# Patient Record
Sex: FEMALE | Race: WHITE | Employment: OTHER | URBAN - METROPOLITAN AREA
[De-identification: names, ages, dates, MRNs, and addresses within clinical notes are randomized per-mention and may not be internally consistent; named-entity substitution may affect disease eponyms.]

---

## 2017-01-10 ENCOUNTER — HOSPITAL ENCOUNTER (OUTPATIENT)
Dept: GENERAL RADIOLOGY | Age: 67
Discharge: HOME | End: 2017-01-10
Attending: PHYSICAL MEDICINE & REHABILITATION
Payer: MEDICARE

## 2017-01-10 DIAGNOSIS — M43.20 FUSION OF SPINE: ICD-10-CM

## 2017-01-10 PROCEDURE — 72050 X-RAY EXAM NECK SPINE 4/5VWS: CPT

## 2017-01-12 ENCOUNTER — HOSPITAL ENCOUNTER (OUTPATIENT)
Dept: MRI IMAGING | Age: 67
Discharge: HOME | End: 2017-01-12
Payer: MEDICARE

## 2017-01-12 DIAGNOSIS — I60.02: ICD-10-CM

## 2017-01-12 PROCEDURE — 70544 MR ANGIOGRAPHY HEAD W/O DYE: CPT

## 2017-05-31 RX ORDER — ESTRADIOL 10 UG/1
10 INSERT VAGINAL
COMMUNITY

## 2017-06-01 ENCOUNTER — HOSPITAL ENCOUNTER (OUTPATIENT)
Age: 67
Setting detail: OUTPATIENT SURGERY
Discharge: HOME | End: 2017-06-01
Attending: INTERNAL MEDICINE | Admitting: INTERNAL MEDICINE
Payer: MEDICARE

## 2017-06-01 ENCOUNTER — ANESTHESIA (OUTPATIENT)
Dept: ENDOSCOPY | Age: 67
End: 2017-06-01
Payer: MEDICARE

## 2017-06-01 ENCOUNTER — ANESTHESIA EVENT (OUTPATIENT)
Dept: ENDOSCOPY | Age: 67
End: 2017-06-01
Payer: MEDICARE

## 2017-06-01 VITALS
RESPIRATION RATE: 24 BRPM | HEART RATE: 64 BPM | SYSTOLIC BLOOD PRESSURE: 115 MMHG | BODY MASS INDEX: 18.9 KG/M2 | OXYGEN SATURATION: 100 % | WEIGHT: 113.44 LBS | DIASTOLIC BLOOD PRESSURE: 60 MMHG | HEIGHT: 65 IN

## 2017-06-01 PROCEDURE — 74011250636 HC RX REV CODE- 250/636

## 2017-06-01 PROCEDURE — 74011250636 HC RX REV CODE- 250/636: Performed by: INTERNAL MEDICINE

## 2017-06-01 PROCEDURE — 77030009426 HC FCPS BIOP ENDOSC BSC -B: Performed by: INTERNAL MEDICINE

## 2017-06-01 PROCEDURE — 88305 TISSUE EXAM BY PATHOLOGIST: CPT | Performed by: INTERNAL MEDICINE

## 2017-06-01 PROCEDURE — 74011000258 HC RX REV CODE- 258

## 2017-06-01 PROCEDURE — 76040000019: Performed by: INTERNAL MEDICINE

## 2017-06-01 PROCEDURE — 76060000031 HC ANESTHESIA FIRST 0.5 HR: Performed by: INTERNAL MEDICINE

## 2017-06-01 RX ORDER — FLUMAZENIL 0.1 MG/ML
0.2 INJECTION INTRAVENOUS
Status: DISCONTINUED | OUTPATIENT
Start: 2017-06-01 | End: 2017-06-01 | Stop reason: HOSPADM

## 2017-06-01 RX ORDER — DEXTROMETHORPHAN/PSEUDOEPHED 2.5-7.5/.8
1.2 DROPS ORAL
Status: DISCONTINUED | OUTPATIENT
Start: 2017-06-01 | End: 2017-06-01 | Stop reason: HOSPADM

## 2017-06-01 RX ORDER — SODIUM CHLORIDE 0.9 % (FLUSH) 0.9 %
5-10 SYRINGE (ML) INJECTION AS NEEDED
Status: DISCONTINUED | OUTPATIENT
Start: 2017-06-01 | End: 2017-06-01 | Stop reason: HOSPADM

## 2017-06-01 RX ORDER — SODIUM CHLORIDE 9 MG/ML
100 INJECTION, SOLUTION INTRAVENOUS CONTINUOUS
Status: DISCONTINUED | OUTPATIENT
Start: 2017-06-01 | End: 2017-06-01 | Stop reason: HOSPADM

## 2017-06-01 RX ORDER — NALOXONE HYDROCHLORIDE 0.4 MG/ML
0.4 INJECTION, SOLUTION INTRAMUSCULAR; INTRAVENOUS; SUBCUTANEOUS
Status: DISCONTINUED | OUTPATIENT
Start: 2017-06-01 | End: 2017-06-01 | Stop reason: HOSPADM

## 2017-06-01 RX ORDER — SODIUM CHLORIDE 0.9 % (FLUSH) 0.9 %
5-10 SYRINGE (ML) INJECTION EVERY 8 HOURS
Status: DISCONTINUED | OUTPATIENT
Start: 2017-06-01 | End: 2017-06-01 | Stop reason: HOSPADM

## 2017-06-01 RX ORDER — MIDAZOLAM HYDROCHLORIDE 1 MG/ML
.25-1 INJECTION, SOLUTION INTRAMUSCULAR; INTRAVENOUS
Status: DISCONTINUED | OUTPATIENT
Start: 2017-06-01 | End: 2017-06-01 | Stop reason: HOSPADM

## 2017-06-01 RX ORDER — ATROPINE SULFATE 0.1 MG/ML
0.5 INJECTION INTRAVENOUS
Status: DISCONTINUED | OUTPATIENT
Start: 2017-06-01 | End: 2017-06-01 | Stop reason: HOSPADM

## 2017-06-01 RX ORDER — FENTANYL CITRATE 50 UG/ML
200 INJECTION, SOLUTION INTRAMUSCULAR; INTRAVENOUS
Status: DISCONTINUED | OUTPATIENT
Start: 2017-06-01 | End: 2017-06-01 | Stop reason: HOSPADM

## 2017-06-01 RX ORDER — PROPOFOL 10 MG/ML
INJECTION, EMULSION INTRAVENOUS AS NEEDED
Status: DISCONTINUED | OUTPATIENT
Start: 2017-06-01 | End: 2017-06-01 | Stop reason: HOSPADM

## 2017-06-01 RX ORDER — EPINEPHRINE 0.1 MG/ML
1 INJECTION INTRACARDIAC; INTRAVENOUS
Status: DISCONTINUED | OUTPATIENT
Start: 2017-06-01 | End: 2017-06-01 | Stop reason: HOSPADM

## 2017-06-01 RX ORDER — SODIUM CHLORIDE 9 MG/ML
INJECTION, SOLUTION INTRAVENOUS
Status: DISCONTINUED | OUTPATIENT
Start: 2017-06-01 | End: 2017-06-01 | Stop reason: HOSPADM

## 2017-06-01 RX ADMIN — PROPOFOL 30 MG: 10 INJECTION, EMULSION INTRAVENOUS at 14:26

## 2017-06-01 RX ADMIN — SODIUM CHLORIDE: 9 INJECTION, SOLUTION INTRAVENOUS at 14:18

## 2017-06-01 RX ADMIN — SODIUM CHLORIDE 100 ML/HR: 900 INJECTION, SOLUTION INTRAVENOUS at 14:11

## 2017-06-01 RX ADMIN — PROPOFOL 50 MG: 10 INJECTION, EMULSION INTRAVENOUS at 14:23

## 2017-06-01 NOTE — PROCEDURES
[address] [place] [address], [address] [place] [address] (EGD) Procedure Note    Fausto Valera  1950  [id number]      Procedure: Endoscopic Gastroduodenoscopy with biopsy    Indication:  Abdominal pain, epigastric     Pre-operative Diagnosis: see indication above    Post-operative Diagnosis: see findings below    : Jostin Levin MD    Referring Provider:  Christina Weir MD      Anesthesia/Sedation:  MAC anesthesia Propofol        Procedure Details     After infomed consent was obtained for the procedure, with all risks and benefits of procedure explained the patient was taken to the endoscopy suite and placed in the left lateral decubitus position. Following sequential administration of sedation as per above, the endoscope was inserted into the mouth and advanced under direct vision to third portion of the duodenum. A careful inspection was made as the gastroscope was withdrawn, including a retroflexed view of the proximal stomach; findings and interventions are described below. Findings:   Esophagus:hiatal hernia 3 cm in size   Stomach: gastritis in antrum, biopsied  Duodenum: normal, biopsied      Therapies:  none    Specimens: as above         EBL: None      Complications:   None; patient tolerated the procedure well. Impression:    -See post-procedure diagnoses.     Recommendations:  -Continue acid suppression with dexilant 60 mg/d  -will order gastric scan to look for gastroparesis  -f/u in 6 weeks    Signed By: Jostin Levin MD     6/1/2017  2:32 PM

## 2017-06-01 NOTE — IP AVS SNAPSHOT
[address] 
[phone] Patient: Minta Angelucci MRN: [id number] You are allergic to the following Allergen Reactions Codeine Other (comments) Increased heart rate, \"felt like I was going to pass out\", nausea. Contrast Dye (Iodine) Other (comments) Urticaria. Demerol (Meperidine) Other (comments) Increased heart rate, \"felt like I was going to pass out, nausea. Dilaudid (Hydromorphone) Itching Epinephrine Nausea Only \"Felt as if I was going to pass out, and felt as if my heart rate was slowing down\". Nsaids (Non-Steroidal Anti-Inflammatory Drug) Other (comments) Have caused kidney problems. Recent Documentation Height Weight Breastfeeding? BMI OB Status Smoking Status 1.651 m 51.5 kg No 18.88 kg/m2 Postmenopausal Never Smoker Emergency Contacts Name Discharge Info Relation Home Work Mobile [employer] CAREGIVER [3] Spouse [3] [phone]  [phone] About your hospitalization You were admitted on:  2017 You last received care in the:  [facility] ENDOSCOPY You were discharged on:  2017 Unit phone number:  [phone] Why you were hospitalized Your primary diagnosis was:  Not on File Providers Seen During Your Hospitalizations Provider Role Specialty Primary office phone Jeet Concepcion MD Attending Provider Gastroenterology [phone] Your Primary Care Physician (PCP) Primary Care Physician Office Phone Office Fax [address] [phone] Follow-up Information None Current Discharge Medication List  
  
CONTINUE these medications which have NOT CHANGED Dose & Instructions Dispensing Information Comments Morning Noon Evening Bedtime  ASPIRIN PO  
   
 Your last dose was: Your next dose is:    
   
   
 Dose:  81 mg Take 81 mg by mouth daily. Refills:  0  
     
   
   
   
  
 atorvastatin 10 mg tablet Commonly known as:  LIPITOR Your last dose was: Your next dose is:    
   
   
 Dose:  10 mg Take 10 mg by mouth daily. Refills:  0  
     
   
   
   
  
 CALCIUM 600 WITH VITAMIN D3 PO Your last dose was: Your next dose is:    
   
   
 Dose:  600 mg Take 600 mg by mouth two (2) times a day. With 800 units of vitamin d in each pill plus minerals. Refills:  0 DEXILANT 30 mg capsule Generic drug:  dexlansoprazole Your last dose was: Your next dose is:    
   
   
 Dose:  60 mg Take 60 mg by mouth daily. Refills:  0  
     
   
   
   
  
 DIOVAN 80 mg tablet Generic drug:  valsartan Your last dose was: Your next dose is:    
   
   
 Dose:  80 mg Take 80 mg by mouth daily. Refills:  0  
     
   
   
   
  
 FLEXERIL 10 mg tablet Generic drug:  cyclobenzaprine Your last dose was: Your next dose is:    
   
   
 Dose:  10 mg Take 10 mg by mouth two (2) times daily as needed for Muscle Spasm(s). Refills:  0 LINZESS 290 mcg Cap capsule Generic drug:  linaclotide Your last dose was: Your next dose is:    
   
   
 Dose:  290 mcg Take 290 mcg by mouth every other day. Refills:  0 LUNESTA 3 mg tablet Generic drug:  eszopiclone Your last dose was: Your next dose is:    
   
   
 Dose:  3 mg Take 3 mg by mouth nightly. Refills:  0  
     
   
   
   
  
 ondansetron hcl 4 mg tablet Commonly known as:  Delmi Rodriguez Your last dose was: Your next dose is:    
   
   
 Dose:  4 mg Take 4 mg by mouth three (3) times daily as needed for Nausea. Indications: ACUTE GASTROENTERITIS-RELATED VOMITING IN PEDIATRICS Refills:  0 OXYCONTIN PO Your last dose was: Your next dose is:    
   
   
 Dose:  10 mg Take 10 mg by mouth daily as needed. Refills:  0 PERCOCET 5-325 mg per tablet Generic drug:  oxyCODONE-acetaminophen Your last dose was: Your next dose is:    
   
   
 Dose:  1 Tab Take 1 Tab by mouth two (2) times daily as needed for Pain. Refills:  0  
     
   
   
   
  
 sertraline 50 mg tablet Commonly known as:  ZOLOFT Your last dose was: Your next dose is:    
   
   
 Dose:  50 mg Take 50 mg by mouth daily. Refills:  0 YUVAFEM 10 mcg Tab vaginal tablet Generic drug:  estradiol Your last dose was: Your next dose is:    
   
   
 Dose:  10 mcg Insert 10 mcg into vagina every Monday and Thursday. Refills:  0 Discharge Instructions [address] [place] [address] DISCHARGE INSTRUCTIONS Tammy Washington [id number] 
1950 Discomfort: 
Sore throat- throat lozenges or warm salt water gargle 
redness at IV site- apply warm compress to area; if redness or soreness persist- contact your physician Gaseous discomfort- walking, belching will help relieve any discomfort You may not operate a vehicle for 12 hours You may not engage in an occupation involving machinery or appliances for rest of today You may not drink alcoholic beverages for at least 12 hours Avoid making any critical decisions for at least 24 hour DIET You may resume your regular diet  however -  remember your colon is empty and a heavy meal will produce gas. Avoid these foods:  vegetables, fried / greasy foods, carbonated drinks ACTIVITY You may resume your normal daily activities Spend the remainder of the day resting -  avoid any strenuous activity. CALL M.D. ANY SIGN OF Increasing pain, nausea, vomiting Abdominal distension (swelling) New increased bleeding (oral or rectal) Fever (chills) Pain in chest area Bloody discharge from nose or mouth Shortness of breath Follow-up Instructions: 
 Call Dr. Dona Higuera for any questions or problems and follow up with him in 6 weeks Telephone # [phone] Will order gastric scan to study your stomach motility ENDOSCOPY FINDINGS: 
 Your endoscopy showed gastritis, unchanged from before. Signed By: Dona Higuera MD   
 6/1/2017  2:36 PM 
  
 
 
Discharge Orders None Introducing [facility]! [facility] introduces [employer] patient portal. Now you can access parts of your medical record, email your doctor's office, and request medication refills online. 1. In your internet browser, go to [email or web address] 2. Click on the First Time User? Click Here link in the Sign In box. You will see the New Member Sign Up page. 3. Enter your [employer] Access Code exactly as it appears below. You will not need to use this code after youve completed the sign-up process. If you do not sign up before the expiration date, you must request a new code. · [employer] Access Code: [email or web address] Expires: 8/30/2017  1:03 PM 
 
4. Enter the last four digits of your Social Security Number (xxxx) and Date of Birth (mm/dd/yyyy) as indicated and click Submit. You will be taken to the next sign-up page. 5. Create a [employer] ID. This will be your [employer] login ID and cannot be changed, so think of one that is secure and easy to remember. 6. Create a [employer] password. You can change your password at any time. 7. Enter your Password Reset Question and Answer. This can be used at a later time if you forget your password. 8. Enter your e-mail address. You will receive e-mail notification when new information is available in [address] 9. Click Sign Up. You can now view and download portions of your medical record. 10. Click the Download Summary menu link to download a portable copy of your medical information. If you have questions, please visit the Frequently Asked Questions section of the [employer] website. Remember, MyChart is NOT to be used for urgent needs. For medical emergencies, dial 911. Now available from your iPhone and Android! General Information Please provide this summary of care documentation to your next provider. Patient Signature:  ____________________________________________________________ Date:  ____________________________________________________________  
  
Gabby Santana Provider Signature:  ____________________________________________________________ Date:  ____________________________________________________________

## 2017-06-01 NOTE — H&P
The patient is a 77year old female who presents with a complaint of Pain. The patient presents for follow up after procedure. The onset of the pain has been gradual and has been occurring in an intermittent pattern for 3 months. The symptoms have been associated with abdominal distention and constipation,  while the symptoms have not been associated with amenorrhea, anorexia, bloating, bloody stools, black stools, bulky stools, bone pain, chest pain, dark urine, diarrhea, dysuria, early satiety, family history of colon cancer, fever, heartburn, hematemesis, hematuria, jaundice, melena, nausea, passing worms, pica, use of alcohol, vaginal bleeding, vaginal discharge, vomiting or weight loss.  Note for \"Pain\": she has h/o GS, abdominal adhesions, c/o more lower abdominal pain with bloating, negative pelvic US, h/o GERD well controlled on dexilant, also followed by Dr. Mariia Frey for MGUS, family h/o colon cancer, due for colonoscopy, friend with my pt, Issa Burton, one BM q 4 days,a also c/o epigastric pain with nausea and radiation to back 11/4/15, feels much better s/p lap chol on oct 20 by Dr. Dolores Crenshaw, still on linzess for her constipation s/p negative EGD and colonoscopy 12/17/15, c/o nausea on daily basis since her lap chol with some mild epigastric pain, US and labs ordered by Dr. Miriam Little were normal, on dexilant, and zofran 5/31/17, c/o worsening nausea and epigastric pain for last 6 weeks, on dexilant 30 mg/d, no NSAIDS      Problem List/Past Medical Riya Alvarenga; 5/30/2017 10:31 AM)  Degenerative Disc Disease   Breast fibroadenoma (217  D24.9)   Nausea (787.02  R11.0)   Cerebral aneurysm (437.3  I67.1)   Endometriosis   Vertigo   Chronic anxiety (300.00  F41.9)   Abdominal swelling, generalized (789.37  R19.07)   Dysphagia (787.20  R13.10)   Constipation (564.00  K59.00)   Esophageal reflux (530.81  K21.9)   Epigastric pain (789.06  R10.13)   Abdominal pain (789.00  R10.9)   Abdominal pain, generalized (789.07  R10.84)   Chronic pain (338.29  G89.29)   Hypercholesterolemia   Dubon's Esophagus   Esophageal ulcer (530.20  K22.10)   Kidney damage (866.00  S37.009A)   Hypertension   Hiatal Hernia   Anemia   MGUS (monoclonal gammopathy of unknown significance) (273.1  D47.2)   High triglycerides (272.1  E78.1)   Gastroesophageal Reflux Disease   Anxiety/Depression   Osteopenia   Neck pain, chronic (723.1  M54.2)   Gallstone (574.20  K80.20)     Past Surgical History Betsy Chaparro; 5/30/2017 10:31 AM)  Non-Contributory Past Surgical History     Allergies Betsy Fordad; 5/30/2017 10:35 AM)  Dilaudid *ANALGESICS - OPIOID*   Erythromycins   EPINEPHrine *Vasopressors**   Codeine/Codeine Derivatives   Demerol *ANALGESICS - OPIOID*   Iodinated Contrast     Medication History Betsy Chaparro; 5/30/2017 10:36 AM)  Wayne Buddle (290MCG Capsule, 1 (one) Capsule Oral daily, take half hour before breakfast, Taken starting 12/13/2016) Active. Sertraline HCl  (50MG Tablet, Oral daily) Active. Calcium-Vitamin D  (600-800 Oral two daily) Specific dose unknown - Active. Dexilant  (30MG Capsule DR, Oral at bedtime) Active. Atorvastatin Calcium  (10MG Tablet, Oral daily) Active. Lunesta  (3MG Tablet, Oral at bedtime as needed) Active. Flexeril  (10MG Tablet, Oral daily) Active. Percocet  (5-325MG Tablet, Oral as needed) Active. Estradiol  (10MCG Tablet, Vaginal three times weekly) Active. Tylenol Extra Strength  (500MG Tablet, Oral as needed) Active. Diovan  (80MG Tablet, Oral daily) Active. Aspirin EC  (81MG Tablet DR, Oral daily) Active. Medications Reconciled     Family History Betsy Chaparro; 5/30/2017 10:36 AM)  Coronary Artery Disease  Mother, Father. Hypertension  Mother, Father. Heart Disease  Mother, Father. Colon Cancer  Father. FH: cerebral aneurysm (V17.1  Z82.49)  Sister. Social History Betsy Chaparro; 5/30/2017 10:36 AM)  Employment status  Retired.   Tobacco Use  Never smoker. Blood Transfusion  Yes. Alcohol Use  Occasional alcohol use, Drinks wine. Marital status  . Diagnostic Studies History Kandice Echeverria; 5/30/2017 10:36 AM)  Colonoscopy   Endoscopy     Health Maintenance History Kandice Echeverria; 5/30/2017 10:31 AM)  Pneumovax  None  Flu Vaccine 09/28/2015    Note: Histrocial Summary information received via phone call with patient on date:  5/30/2017     Review of Systems (Maurice Delaney; 5/31/2017 9:13 AM)  General Not Present- Chronic Fatigue, Poor Appetite, Weight Gain and Weight Loss. Skin Not Present- Itching, Rash and Skin Color Changes. HEENT Not Present- Hearing Loss and Vertigo. Respiratory Not Present- Difficulty Breathing and TB exposure. Cardiovascular Not Present- Chest Pain, Use of Antibiotics before Dental Procedures and Use of Blood Thinners. Gastrointestinal Present- Heartburn, History of Previous Colonoscopy, History of Previous Endoscopy, Indigestion, Nausea and See HPI. Musculoskeletal Not Present- Arthritis, Hip Replacement Surgery and Knee Replacement Surgery. Neurological Not Present- Weakness. Psychiatric Not Present- Depression. Endocrine Not Present- Diabetes and Thyroid Problems. Hematology Present- Bruising. Not Present- Anemia. Vitals (Maurice Delaney; 5/31/2017 9:16 AM)  5/31/2017 9:09 AM  Weight: 114 lb   Height: 65 in    Body Surface Area: 1.54 m²   Body Mass Index: 18.97 kg/m²  Pulse: 60 (Regular)    Resp. : 16 (Unlabored)     BP: 118/62 (Sitting, Left Arm, Standard)    5/30/2017 10:26 AM  Weight: 115 lb   Height: 65 in    Body Surface Area: 1.55 m²   Body Mass Index: 19.14 kg/m²        Physical Exam (Mary Campos MD; 5/31/2017 9:53 AM)  General  Mental Status - Alert. General Appearance - Cooperative, Pleasant, Not in acute distress. Orientation - Oriented X3. Build & Nutrition - Well nourished and Well developed.     Chest and Lung Exam  Percussion  Quality and Intensity - Percussion of the chest reveals - Normal resonance. Auscultation  Breath sounds - Normal. Adventitious sounds - No Adventitious sounds. Cardiovascular  Auscultation  Rhythm - Regular, No Tachycardia, No Bradycardia . Heart Sounds - Normal heart sounds , S1 WNL and S2 WNL, No S3, No Summation Gallop. Murmurs & Other Heart Sounds - Auscultation of the heart reveals - No Murmurs. Abdomen  Inspection  Incisional scars - No incisional scars. Palpation/Percussion  Tenderness - Non-Tender. Rebound tenderness - No rebound. Rigidity (guarding) - No Rigidity. Dullness to percussion - No abnormal dullness to percussion. Liver - No hepatosplenomegaly. Abdominal Mass Palpable - No masses. Other Characteristics - No Ascites. Auscultation  Auscultation of the abdomen reveals - Bowel sounds normal, No Abdominal bruits and No Succussion splash. Peripheral Vascular  Upper Extremity  Inspection - Left - Normal - No Clubbing, No Cyanosis, No Edema, Pulses Intact. Right - Normal - No Clubbing, No Cyanosis, No Edema, Pulses Intact. Palpation - Edema - Left - No edema. Right - No edema. Lower Extremity  Inspection - Left - Inspection Normal. Right - Inspection Normal. Palpation - Edema - Left - No edema. Right - No edema. Neurologic  Neurologic evaluation reveals  - Cranial nerves grossly intact, no focal neurologic deficits. Motor  Involuntary Movements - Asterixis - not present. Assessment & Plan Jhonny Cordova MD; 5/31/2017 9:55 AM)  Epigastric pain (789.06  R10.13)  Impression: c/o worsening nausea and epigastric pain for last 6 weeks, on dexilant 30 mg/d, no NSAIDS  EGD to r/o any ulcers and if negative then will get gastric emptying scan  increase dexilant to 60 mg/d  Current Plans    Pt Education - How to access health information online: discussed with patient and provided information.   Endoscopy (72563) (Discussed risks and benefits with the patient to include: perforation, post polypectomy, or post biopsy bleeding, missed lesions, and sedation reactions.)  Abdominal pain (789.00  R10.9)  Nausea (787.02  R11.0)  Current Plans  Started Ondansetron HCl 4MG, 1 (one) Tablet TID as needed for nausea, #30, 30 days starting 05/31/2017, Ref. x3.  Esophageal reflux (530.81  K21.9)  Current Plans  Started Dexilant 60mg, 1 (one) Tablet daily, #30, 30 days starting 05/31/2017, Ref. x12. Constipation (564.00  K59.00)  Impression: well controlled on linzess every other day  Date of Surgery Update:  Brooke Nunez was seen and examined. History and physical has been reviewed. The patient has been examined.  There have been no significant clinical changes since the completion of the originally dated History and Physical.    Signed By: Dona Higuera MD     June 1, 2017 2:18 PM

## 2017-06-01 NOTE — DISCHARGE INSTRUCTIONS
[address]   [address] [place] [address], [address] [facility]    EGD [address]  [id number]  1950    Discomfort:  Sore throat- throat lozenges or warm salt water gargle  redness at IV site- apply warm compress to area; if redness or soreness persist- contact your physician  Gaseous discomfort- walking, belching will help relieve any discomfort  You may not operate a vehicle for 12 hours  You may not engage in an occupation involving machinery or appliances for rest of today  You may not drink alcoholic beverages for at least 12 hours  Avoid making any critical decisions for at least 24 hour  DIET  You may resume your regular diet - however -  remember your colon is empty and a heavy meal will produce gas. Avoid these foods:  vegetables, fried / greasy foods, carbonated drinks    ACTIVITY  You may resume your normal daily activities   Spend the remainder of the day resting -  avoid any strenuous activity. CALL M.D. ANY SIGN OF   Increasing pain, nausea, vomiting  Abdominal distension (swelling)  New increased bleeding (oral or rectal)  Fever (chills)  Pain in chest area  Bloody discharge from nose or mouth  Shortness of breath    Follow-up Instructions:   Call Dr. Anthony Agee for any questions or problems and follow up with him in 6 weeks  Telephone # [phone]  Will order gastric scan to study your stomach motility    ENDOSCOPY FINDINGS:   Your endoscopy showed gastritis, unchanged from before.     Signed By: Anthony Agee MD     6/1/2017  2:36 PM

## 2017-06-01 NOTE — ANESTHESIA POSTPROCEDURE EVALUATION
Post-Anesthesia Evaluation and Assessment    Patient: Balta Alfonso MRN: [id number]  SSN: [id number]    YOB: 1950  Age: 77 y.o. Sex: female       Cardiovascular Function/Vital Signs  Visit Vitals    /57    Pulse 68    Resp 17    Ht 5' 5\" (1.651 m)    Wt 51.5 kg (113 lb 7 oz)    SpO2 100%    Breastfeeding No    BMI 18.88 kg/m2       Patient is status post MAC anesthesia for Procedure(s):  ESOPHAGOGASTRODUODENOSCOPY (EGD)  ESOPHAGOGASTRODUODENAL (EGD) BIOPSY. Nausea/Vomiting: None    Postoperative hydration reviewed and adequate. Pain:  Pain Scale 1: Numeric (0 - 10) (06/01/17 1440)  Pain Intensity 1: 0 (06/01/17 1440)   Managed    Neurological Status: At baseline    Mental Status and Level of Consciousness: Arousable    Pulmonary Status:   O2 Device: Room air (06/01/17 1440)   Adequate oxygenation and airway patent    Complications related to anesthesia: None    Post-anesthesia assessment completed.  No concerns    Signed By: Soumya Villarreal MD     June 1, 2017

## 2017-06-01 NOTE — ANESTHESIA PREPROCEDURE EVALUATION
Anesthetic History     Increased risk of difficult airway and PONV          Review of Systems / Medical History  Patient summary reviewed, nursing notes reviewed and pertinent labs reviewed    Pulmonary                   Neuro/Psych             Comments: Limited flex and ext due to fusion Cardiovascular    Hypertension        Dysrhythmias       Exercise tolerance: >4 METS     GI/Hepatic/Renal     GERD          Comments: Epigastric pain Endo/Other        Arthritis     Other Findings   Comments: Cervical disc disease--persistent numbness/ weakness in upper ext L>R  Cerebral aneurysm--stable         Physical Exam    Airway  Mallampati: II  TM Distance: > 6 cm  Neck ROM: decreased range of motion   Mouth opening: Normal     Cardiovascular    Rhythm: regular  Rate: normal         Dental  No notable dental hx       Pulmonary  Breath sounds clear to auscultation               Abdominal  Abdominal exam normal       Other Findings            Anesthetic Plan    ASA: 2  Anesthesia type: MAC          Induction: Intravenous  Anesthetic plan and risks discussed with: Patient

## 2017-06-25 ENCOUNTER — HOSPITAL ENCOUNTER (EMERGENCY)
Age: 67
Discharge: HOME | End: 2017-06-25
Attending: EMERGENCY MEDICINE
Payer: MEDICARE

## 2017-06-25 VITALS
WEIGHT: 114.64 LBS | OXYGEN SATURATION: 98 % | HEIGHT: 65 IN | RESPIRATION RATE: 20 BRPM | DIASTOLIC BLOOD PRESSURE: 68 MMHG | SYSTOLIC BLOOD PRESSURE: 160 MMHG | TEMPERATURE: 98.1 F | BODY MASS INDEX: 19.1 KG/M2 | HEART RATE: 96 BPM

## 2017-06-25 DIAGNOSIS — S05.02XA CORNEAL ABRASION, LEFT, INITIAL ENCOUNTER: ICD-10-CM

## 2017-06-25 DIAGNOSIS — H10.212 CHEMICAL CONJUNCTIVITIS OF LEFT EYE: Primary | ICD-10-CM

## 2017-06-25 PROCEDURE — 99284 EMERGENCY DEPT VISIT MOD MDM: CPT

## 2017-06-25 PROCEDURE — 74011000250 HC RX REV CODE- 250: Performed by: EMERGENCY MEDICINE

## 2017-06-25 RX ORDER — TETRACAINE HYDROCHLORIDE 5 MG/ML
1 SOLUTION OPHTHALMIC
Status: COMPLETED | OUTPATIENT
Start: 2017-06-25 | End: 2017-06-25

## 2017-06-25 RX ORDER — ERYTHROMYCIN 5 MG/G
OINTMENT OPHTHALMIC
Qty: 3.5 G | Refills: 0 | Status: SHIPPED | OUTPATIENT
Start: 2017-06-25 | End: 2017-07-02

## 2017-06-25 RX ADMIN — TETRACAINE HYDROCHLORIDE 1 DROP: 5 SOLUTION OPHTHALMIC at 18:25

## 2017-06-25 RX ADMIN — FLUORESCEIN SODIUM 1 STRIP: 1 STRIP OPHTHALMIC at 18:25

## 2017-06-25 NOTE — ED TRIAGE NOTES
Patient ambulatory to ED treatment area with steady gait for complaint of \"around 3:30pm I got a large amount of medicated dog shampoo into my left eye. \" Patient reports that she did flush the eye out. Patient says \"every time I blink it feels like I have a piece of wood in there and it is starting to burn again. \"

## 2017-06-25 NOTE — ED PROVIDER NOTES
HPI Comments: 55-year-old white female presents to the emergency department with left eye injury. Patient was shampooing her dog with a medicated shampoo. The shampoo squirted into her left eye. This occurred several hours ago. She was able to wash her left eye out with water. She was then able to irrigate her left eye with eye irrigation system. She now is having irritation of the left eye. The uric patient is mild in severity. Irritation is worse if she blinks her eye. Patient denies any previous surgery on her left eye. She denies any contact lenses. Patient denies headache, neck pain, fevers, vomiting. She denies tobacco or alcohol use. The history is provided by the patient.         Past Medical History:   Diagnosis Date    Aneurysm ([facility] [address]     2 cerebral aneurysm - no surgery or bleed    Arrhythmia     PAC/mitral valve prolapse    Arthritis     osteo    Chronic kidney disease     mild kidney damage - no treatments needed    Chronic pain     neck/intermittent extremity numbness/lower back    Difficult intubation     limited ability to flex or hyperextend neck d/t fusion (left arm and left leg numbness if neck is flexed to hyperextended    GERD (gastroesophageal reflux disease)     esophageal ulcer/hx gastritis/small sliding hiatal hernia    Hypertension     Ill-defined condition     increased cholesterol    Ill-defined condition     hx anemia/one transfusion in 1982 - nauseated and \"started to pass out\"    Ill-defined condition     gallstones    Ill-defined condition     vertigo    Ill-defined condition     some difficulty swallowing solids    Ill-defined condition     RLQ abdominal pain - per pt may be d/t adhesions    Ill-defined condition     difficulty voiding    Ill-defined condition     persistent nausea/severe at times/episode RUQ pain    Nausea & vomiting     required to stay in hospital a few extra days - no voimiting/severe nausea only    Psychiatric disorder     anxiety/depression Past Surgical History:   Procedure Laterality Date    BREAST SURGERY PROCEDURE UNLISTED      bilateral breast fibroadenomas removed    HX BREAST BIOPSY Right     benign    HX BREAST BIOPSY Left     benign    HX CHOLECYSTECTOMY      HX HYSTERECTOMY      and left ovary removed for adhesions and endometriosis    HX ORTHOPAEDIC      2 neck surgeries - 3 discectomy spinal cord decompression and fusion C3-C6         Family History:   Problem Relation Age of Onset    Heart Disease Mother     Other Mother      arthritis/cerebral aneurysm/PVD    Hypertension Mother     High Cholesterol Mother     Colon Cancer Father     Heart Disease Father     Other Father      arthritis    Hypertension Father     High Cholesterol Father     Other Sister      cerebral aneurysm    Other Maternal Grandmother      cerebral aneurysm    Other Maternal Aunt      cerebral aneurysm       Social History     Social History    Marital status:      Spouse name: N/A    Number of children: N/A    Years of education: N/A     Occupational History    Not on file. Social History Main Topics    Smoking status: Never Smoker    Smokeless tobacco: Not on file    Alcohol use 2.4 oz/week     4 Glasses of wine per week    Drug use: Not on file    Sexual activity: Not on file     Other Topics Concern    Not on file     Social History Narrative         ALLERGIES: Codeine; Contrast dye [iodine]; Demerol [meperidine]; Dilaudid [hydromorphone]; Epinephrine; and Nsaids (non-steroidal anti-inflammatory drug)    Review of Systems   Constitutional: Negative for fever. HENT: Negative for facial swelling and nosebleeds. Eyes: Positive for pain and redness. Respiratory: Negative for cough, chest tightness and shortness of breath. Cardiovascular: Negative for chest pain and leg swelling. Gastrointestinal: Negative for abdominal pain and vomiting. Endocrine: Negative for polyuria.    Genitourinary: Negative for difficulty urinating and flank pain. Musculoskeletal: Negative for arthralgias and back pain. Skin: Negative for color change. Allergic/Immunologic: Negative for immunocompromised state. Neurological: Negative for dizziness and headaches. Hematological: Does not bruise/bleed easily. Psychiatric/Behavioral: Negative for agitation. All other systems reviewed and are negative. Vitals:    06/25/17 1818   BP: 160/68   Pulse: 96   Resp: 20   Temp: 98.1 °F (36.7 °C)   SpO2: 98%   Weight: 52 kg (114 lb 10.2 oz)   Height: 5' 5\" (1.651 m)            Physical Exam   Constitutional: She is oriented to person, place, and time. She appears well-developed and well-nourished. HENT:   Head: Normocephalic and atraumatic. Right Ear: External ear normal.   Left Ear: External ear normal.   Nose: Nose normal.   Mouth/Throat: Oropharynx is clear and moist.   Eyes: EOM are normal. Pupils are equal, round, and reactive to light. No scleral icterus. Redness of left conjunctiva, normal EOM bilaterally, pupils 4 mm and reactive bilaterally    With flurescene stain, very small abrasion at 12 oclock in the left cornea   Neck: Normal range of motion. Neck supple. No JVD present. No tracheal deviation present. No thyromegaly present. Cardiovascular: Normal rate, regular rhythm, normal heart sounds and intact distal pulses. Exam reveals no friction rub. No murmur heard. Pulmonary/Chest: Effort normal and breath sounds normal. No stridor. No respiratory distress. She has no wheezes. She has no rales. She exhibits no tenderness. Abdominal: Soft. Bowel sounds are normal. She exhibits no distension. There is no tenderness. There is no rebound and no guarding. Musculoskeletal: Normal range of motion. She exhibits no edema, tenderness or deformity. Lymphadenopathy:     She has no cervical adenopathy. Neurological: She is alert and oriented to person, place, and time. She has normal reflexes. No cranial nerve deficit.  She exhibits normal muscle tone. Coordination normal.   Skin: Skin is warm and dry. No rash noted. No erythema. Psychiatric: She has a normal mood and affect. Her behavior is normal. Judgment and thought content normal.   Nursing note and vitals reviewed. MDM  Number of Diagnoses or Management Options  Diagnosis management comments: Left eye is irritated. Will check fluorescein stain. We'll also apply tetracaine drops and irrigate the left eye. Differential diagnosis includes chemical conjunctivitis, ulcer, corneal abrasion. We'll reassess after irrigation. Amount and/or Complexity of Data Reviewed  Decide to obtain previous medical records or to obtain history from someone other than the patient: yes  Obtain history from someone other than the patient: yes  Review and summarize past medical records: yes  Independent visualization of images, tracings, or specimens: yes    Risk of Complications, Morbidity, and/or Mortality  Presenting problems: moderate  Diagnostic procedures: moderate  Management options: moderate    Patient Progress  Patient progress: improved    ED Course       Procedures    Irrigated the left eye with 1 L of LR    Applied Tetracaine drops x 3 drops to left eye with good pain relief    Small abrasion in left eye, will treat with Ilotycin ointment, Motrin    She has an optoemtrist for follow up      Good return precautions given to patient. Close follow up with PCP recommended. Patient and/or family voices understanding of this plan. Discharge instructions were explained by me and all concerns were addressed.

## 2017-06-25 NOTE — DISCHARGE INSTRUCTIONS
Corneal Scratches: Care Instructions  Your Care Instructions    The cornea is the clear surface that covers the front of the eye. When a speck of dirt, a wood chip, an insect, or another object flies into your eye, it can cause a painful scratch on the cornea. Wearing contact lenses too long or rubbing your eyes can also scratch the cornea. Small scratches usually heal in a day or two. Deeper scratches may take longer. If you have had a foreign object removed from your eye or you have a corneal scratch, you will need to watch for infection and vision problems while your eye heals. Follow-up care is a key part of your treatment and safety. Be sure to make and go to all appointments, and call your doctor if you are having problems. It's also a good idea to know your test results and keep a list of the medicines you take. How can you care for yourself at home? · The doctor probably used a medicine during your exam to numb your eye. When it wears off in 30 to 60 minutes, your eye pain may come back. Take pain medicines exactly as directed. ¨ If the doctor gave you a prescription medicine for pain, take it as prescribed. ¨ If you are not taking a prescription pain medicine, ask your doctor if you can take an over-the-counter medicine. ¨ Do not take two or more pain medicines at the same time unless the doctor told you to. Many pain medicines have acetaminophen, which is Tylenol. Too much acetaminophen (Tylenol) can be harmful. · Do not rub your injured eye. Rubbing can make it worse. · Use the prescribed eyedrops or ointment as directed. Be sure the dropper or bottle tip is clean. To put in eyedrops or ointment:  ¨ Tilt your head back, and pull your lower eyelid down with one finger. ¨ Drop or squirt the medicine inside the lower lid. ¨ Close your eye for 30 to 60 seconds to let the drops or ointment move around. ¨ Do not touch the ointment or dropper tip to your eyelashes or any other surface.   · Do not use your contact lens in your hurt eye until your doctor says you can. Also, do not wear eye makeup until your eye has healed. · Do not drive if you have blurred vision. · Bright light may hurt. Sunglasses can help. · To prevent eye injuries in the future, wear safety glasses or goggles when you work with machines or tools, mow the lawn, or ride a bike or motorcycle. When should you call for help? Call your doctor now or seek immediate medical care if:  · You have signs of an eye infection, such as:  ¨ Pus or thick discharge coming from the eye. ¨ Redness or swelling around the eye. ¨ A fever. · You have new or worse eye pain. · You have vision changes. · It feels like there is something in your eye. · Light hurts your eye. Watch closely for changes in your health, and be sure to contact your doctor if:  · You do not get better as expected. Where can you learn more? Go to http://ashwin-dorian.info/. Enter X470 in the search box to learn more about \"Corneal Scratches: Care Instructions. \"  Current as of: March 20, 2017  Content Version: 11.3  © 4148-7261 [employer]. Care instructions adapted under license by [employer] (which disclaims liability or warranty for this information). If you have questions about a medical condition or this instruction, always ask your healthcare professional. [place] [address] any warranty or liability for your use of this information. Pinkeye: Care Instructions  Your Care Instructions    Pinkeye is redness and swelling of the eye surface and the conjunctiva (the lining of the eyelid and the covering of the white part of the eye). Pinkeye is also called conjunctivitis. Pinkeye is often caused by infection with bacteria or a virus. Dry air, allergies, smoke, and chemicals are other common causes. Pinkeye often clears on its own in 7 to 10 days. Antibiotics only help if the pinkeye is caused by bacteria. Pinkeye caused by infection spreads easily. If an allergy or chemical is causing pinkeye, it will not go away unless you can avoid whatever is causing it. Follow-up care is a key part of your treatment and safety. Be sure to make and go to all appointments, and call your doctor if you are having problems. It's also a good idea to know your test results and keep a list of the medicines you take. How can you care for yourself at home? · Wash your hands often. Always wash them before and after you treat pinkeye or touch your eyes or face. · Use moist cotton or a clean, wet cloth to remove crust. Wipe from the inside corner of the eye to the outside. Use a clean part of the cloth for each wipe. · Put cold or warm wet cloths on your eye a few times a day if the eye hurts. · Do not wear contact lenses or eye makeup until the pinkeye is gone. Throw away any eye makeup you were using when you got pinkeye. Clean your contacts and storage case. If you wear disposable contacts, use a new pair when your eye has cleared and it is safe to wear contacts again. · If the doctor gave you antibiotic ointment or eyedrops, use them as directed. Use the medicine for as long as instructed, even if your eye starts looking better soon. Keep the bottle tip clean, and do not let it touch the eye area. · To put in eyedrops or ointment:  ¨ Tilt your head back, and pull your lower eyelid down with one finger. ¨ Drop or squirt the medicine inside the lower lid. ¨ Close your eye for 30 to 60 seconds to let the drops or ointment move around. ¨ Do not touch the ointment or dropper tip to your eyelashes or any other surface. · Do not share towels, pillows, or washcloths while you have pinkeye. When should you call for help? Call your doctor now or seek immediate medical care if:  · You have pain in your eye, not just irritation on the surface. · You have a change in vision or loss of vision.   · You have an increase in discharge from the eye. · Your eye has not started to improve or begins to get worse within 48 hours after you start using antibiotics. · Pinkeye lasts longer than 7 days. Watch closely for changes in your health, and be sure to contact your doctor if you have any problems. Where can you learn more? Go to http://ashwin-dorian.info/. Enter Y392 in the search box to learn more about \"Pinkeye: Care Instructions. \"  Current as of: March 20, 2017  Content Version: 11.3  © 2744-5132 [employer]. Care instructions adapted under license by [employer] (which disclaims liability or warranty for this information). If you have questions about a medical condition or this instruction, always ask your healthcare professional. [address] any warranty or liability for your use of this information.

## 2017-06-25 NOTE — ED NOTES
Spoke with poison control center regarding the Regino dog shampoo. Per poison control, irrigate eye for 15 minutes. After 15 minutes, if photophobia or pain still present examine with slit lamp for corneal abrasion. Patient at risk for corneal edema per poison control due to chlorhexidine gluconate 2% active ingredient in the shampoo. Poison control to call back in an hour to check on patient.

## 2017-06-28 ENCOUNTER — HOSPITAL ENCOUNTER (OUTPATIENT)
Dept: NUCLEAR MEDICINE | Age: 67
Discharge: HOME | End: 2017-06-28
Attending: INTERNAL MEDICINE
Payer: MEDICARE

## 2017-06-28 DIAGNOSIS — K31.84 GASTROPARESIS: ICD-10-CM

## 2017-06-28 PROCEDURE — 78264 GASTRIC EMPTYING IMG STUDY: CPT

## 2017-07-26 ENCOUNTER — HOSPITAL ENCOUNTER (OUTPATIENT)
Dept: MAMMOGRAPHY | Age: 67
Discharge: HOME | End: 2017-07-26
Attending: FAMILY MEDICINE
Payer: MEDICARE

## 2017-07-26 DIAGNOSIS — N64.4 MASTODYNIA: ICD-10-CM

## 2017-07-26 PROCEDURE — 77065 DX MAMMO INCL CAD UNI: CPT

## 2017-11-22 ENCOUNTER — HOSPITAL ENCOUNTER (OUTPATIENT)
Dept: MRI IMAGING | Age: 67
Discharge: HOME | End: 2017-11-22
Attending: FAMILY MEDICINE
Payer: MEDICARE

## 2017-11-22 DIAGNOSIS — M54.32 SCIATICA OF LEFT SIDE: ICD-10-CM

## 2017-11-22 PROCEDURE — 72148 MRI LUMBAR SPINE W/O DYE: CPT

## 2019-01-28 ENCOUNTER — HOSPITAL ENCOUNTER (OUTPATIENT)
Dept: MRI IMAGING | Age: 69
Discharge: HOME | End: 2019-01-28
Attending: NURSE PRACTITIONER
Payer: MEDICARE

## 2019-01-28 DIAGNOSIS — I67.1 ANEURYSM OF LEFT INTERNAL CAROTID ARTERY: ICD-10-CM

## 2019-01-28 PROCEDURE — 70544 MR ANGIOGRAPHY HEAD W/O DYE: CPT

## 2020-09-23 RX ORDER — IRON FUM,PS CMP/VIT C/NIACIN 125-40-3MG
1 CAPSULE ORAL DAILY
COMMUNITY

## 2020-09-23 RX ORDER — PRAVASTATIN SODIUM 40 MG/1
40 TABLET ORAL DAILY
COMMUNITY

## 2020-09-23 RX ORDER — DEXLANSOPRAZOLE 60 MG/1
60 CAPSULE, DELAYED RELEASE ORAL DAILY
COMMUNITY

## 2020-09-23 RX ORDER — ESCITALOPRAM OXALATE 10 MG/1
10 TABLET ORAL DAILY
COMMUNITY

## 2020-10-30 ENCOUNTER — HOSPITAL ENCOUNTER (OUTPATIENT)
Dept: PREADMISSION TESTING | Age: 70
Discharge: HOME | End: 2020-10-30
Payer: MEDICARE

## 2020-10-30 ENCOUNTER — TRANSCRIBE ORDER (OUTPATIENT)
Dept: REGISTRATION | Age: 70
End: 2020-10-30

## 2020-10-30 DIAGNOSIS — Z01.812 PRE-PROCEDURE LAB EXAM: ICD-10-CM

## 2020-10-30 DIAGNOSIS — Z01.812 PRE-PROCEDURE LAB EXAM: Primary | ICD-10-CM

## 2020-10-30 PROCEDURE — 87635 SARS-COV-2 COVID-19 AMP PRB: CPT

## 2020-10-31 LAB — SARS-COV-2, COV2NT: NOT DETECTED

## 2020-11-03 ENCOUNTER — ANESTHESIA EVENT (OUTPATIENT)
Dept: ENDOSCOPY | Age: 70
End: 2020-11-03
Payer: MEDICARE

## 2020-11-03 ENCOUNTER — HOSPITAL ENCOUNTER (OUTPATIENT)
Age: 70
Setting detail: OUTPATIENT SURGERY
Discharge: HOME | End: 2020-11-03
Attending: INTERNAL MEDICINE | Admitting: INTERNAL MEDICINE
Payer: MEDICARE

## 2020-11-03 ENCOUNTER — ANESTHESIA (OUTPATIENT)
Dept: ENDOSCOPY | Age: 70
End: 2020-11-03
Payer: MEDICARE

## 2020-11-03 VITALS
SYSTOLIC BLOOD PRESSURE: 113 MMHG | OXYGEN SATURATION: 100 % | WEIGHT: 119 LBS | BODY MASS INDEX: 19.83 KG/M2 | TEMPERATURE: 97.6 F | HEART RATE: 80 BPM | HEIGHT: 65 IN | DIASTOLIC BLOOD PRESSURE: 58 MMHG | RESPIRATION RATE: 17 BRPM

## 2020-11-03 PROCEDURE — 74011250636 HC RX REV CODE- 250/636: Performed by: NURSE ANESTHETIST, CERTIFIED REGISTERED

## 2020-11-03 PROCEDURE — 76040000007: Performed by: INTERNAL MEDICINE

## 2020-11-03 PROCEDURE — 74011250637 HC RX REV CODE- 250/637: Performed by: INTERNAL MEDICINE

## 2020-11-03 PROCEDURE — 88305 TISSUE EXAM BY PATHOLOGIST: CPT

## 2020-11-03 PROCEDURE — 77030009426 HC FCPS BIOP ENDOSC BSC -B: Performed by: INTERNAL MEDICINE

## 2020-11-03 PROCEDURE — 2709999900 HC NON-CHARGEABLE SUPPLY: Performed by: INTERNAL MEDICINE

## 2020-11-03 PROCEDURE — 74011000250 HC RX REV CODE- 250: Performed by: NURSE ANESTHETIST, CERTIFIED REGISTERED

## 2020-11-03 PROCEDURE — 77030021593 HC FCPS BIOP ENDOSC BSC -A: Performed by: INTERNAL MEDICINE

## 2020-11-03 PROCEDURE — 76060000032 HC ANESTHESIA 0.5 TO 1 HR: Performed by: INTERNAL MEDICINE

## 2020-11-03 RX ORDER — SODIUM CHLORIDE 0.9 % (FLUSH) 0.9 %
5-40 SYRINGE (ML) INJECTION EVERY 8 HOURS
Status: DISCONTINUED | OUTPATIENT
Start: 2020-11-03 | End: 2020-11-03 | Stop reason: HOSPADM

## 2020-11-03 RX ORDER — ATROPINE SULFATE 0.1 MG/ML
0.5 INJECTION INTRAVENOUS
Status: DISCONTINUED | OUTPATIENT
Start: 2020-11-03 | End: 2020-11-03 | Stop reason: HOSPADM

## 2020-11-03 RX ORDER — SODIUM CHLORIDE 9 MG/ML
50 INJECTION, SOLUTION INTRAVENOUS CONTINUOUS
Status: DISCONTINUED | OUTPATIENT
Start: 2020-11-03 | End: 2020-11-03 | Stop reason: HOSPADM

## 2020-11-03 RX ORDER — EPINEPHRINE 0.1 MG/ML
1 INJECTION INTRACARDIAC; INTRAVENOUS
Status: DISCONTINUED | OUTPATIENT
Start: 2020-11-03 | End: 2020-11-03 | Stop reason: HOSPADM

## 2020-11-03 RX ORDER — DEXTROMETHORPHAN/PSEUDOEPHED 2.5-7.5/.8
1.2 DROPS ORAL
Status: DISCONTINUED | OUTPATIENT
Start: 2020-11-03 | End: 2020-11-03 | Stop reason: HOSPADM

## 2020-11-03 RX ORDER — NALOXONE HYDROCHLORIDE 0.4 MG/ML
0.4 INJECTION, SOLUTION INTRAMUSCULAR; INTRAVENOUS; SUBCUTANEOUS
Status: DISCONTINUED | OUTPATIENT
Start: 2020-11-03 | End: 2020-11-03 | Stop reason: HOSPADM

## 2020-11-03 RX ORDER — MIDAZOLAM HYDROCHLORIDE 1 MG/ML
.25-5 INJECTION, SOLUTION INTRAMUSCULAR; INTRAVENOUS
Status: DISCONTINUED | OUTPATIENT
Start: 2020-11-03 | End: 2020-11-03 | Stop reason: HOSPADM

## 2020-11-03 RX ORDER — SODIUM CHLORIDE 9 MG/ML
INJECTION, SOLUTION INTRAVENOUS
Status: DISCONTINUED | OUTPATIENT
Start: 2020-11-03 | End: 2020-11-03 | Stop reason: HOSPADM

## 2020-11-03 RX ORDER — PROPOFOL 10 MG/ML
INJECTION, EMULSION INTRAVENOUS AS NEEDED
Status: DISCONTINUED | OUTPATIENT
Start: 2020-11-03 | End: 2020-11-03 | Stop reason: HOSPADM

## 2020-11-03 RX ORDER — FLUMAZENIL 0.1 MG/ML
0.2 INJECTION INTRAVENOUS
Status: DISCONTINUED | OUTPATIENT
Start: 2020-11-03 | End: 2020-11-03 | Stop reason: HOSPADM

## 2020-11-03 RX ORDER — FENTANYL CITRATE 50 UG/ML
25-200 INJECTION, SOLUTION INTRAMUSCULAR; INTRAVENOUS
Status: DISCONTINUED | OUTPATIENT
Start: 2020-11-03 | End: 2020-11-03 | Stop reason: HOSPADM

## 2020-11-03 RX ORDER — SODIUM CHLORIDE 0.9 % (FLUSH) 0.9 %
5-40 SYRINGE (ML) INJECTION AS NEEDED
Status: DISCONTINUED | OUTPATIENT
Start: 2020-11-03 | End: 2020-11-03 | Stop reason: HOSPADM

## 2020-11-03 RX ORDER — LIDOCAINE HYDROCHLORIDE 20 MG/ML
INJECTION, SOLUTION EPIDURAL; INFILTRATION; INTRACAUDAL; PERINEURAL AS NEEDED
Status: DISCONTINUED | OUTPATIENT
Start: 2020-11-03 | End: 2020-11-03 | Stop reason: HOSPADM

## 2020-11-03 RX ADMIN — PROPOFOL 30 MG: 10 INJECTION, EMULSION INTRAVENOUS at 08:27

## 2020-11-03 RX ADMIN — SODIUM CHLORIDE: 900 INJECTION, SOLUTION INTRAVENOUS at 08:00

## 2020-11-03 RX ADMIN — PROPOFOL 50 MG: 10 INJECTION, EMULSION INTRAVENOUS at 08:12

## 2020-11-03 RX ADMIN — PROPOFOL 30 MG: 10 INJECTION, EMULSION INTRAVENOUS at 08:30

## 2020-11-03 RX ADMIN — PROPOFOL 30 MG: 10 INJECTION, EMULSION INTRAVENOUS at 08:24

## 2020-11-03 RX ADMIN — PROPOFOL 30 MG: 10 INJECTION, EMULSION INTRAVENOUS at 08:21

## 2020-11-03 RX ADMIN — PROPOFOL 50 MG: 10 INJECTION, EMULSION INTRAVENOUS at 08:11

## 2020-11-03 RX ADMIN — LIDOCAINE HYDROCHLORIDE 60 MG: 20 INJECTION, SOLUTION EPIDURAL; INFILTRATION; INTRACAUDAL; PERINEURAL at 08:11

## 2020-11-03 RX ADMIN — PROPOFOL 30 MG: 10 INJECTION, EMULSION INTRAVENOUS at 08:17

## 2020-11-03 RX ADMIN — PROPOFOL 30 MG: 10 INJECTION, EMULSION INTRAVENOUS at 08:19

## 2020-11-03 RX ADMIN — PROPOFOL 30 MG: 10 INJECTION, EMULSION INTRAVENOUS at 08:16

## 2020-11-03 RX ADMIN — PROPOFOL 30 MG: 10 INJECTION, EMULSION INTRAVENOUS at 08:14

## 2020-11-03 NOTE — ANESTHESIA PREPROCEDURE EVALUATION
Relevant Problems   No relevant active problems       Anesthetic History   No history of anesthetic complications  Increased risk of difficult airway          Review of Systems / Medical History  Patient summary reviewed, nursing notes reviewed and pertinent labs reviewed    Pulmonary  Within defined limits                 Neuro/Psych   Within defined limits      Psychiatric history     Cardiovascular  Within defined limits  Hypertension                   GI/Hepatic/Renal  Within defined limits   GERD           Endo/Other  Within defined limits      Arthritis     Other Findings              Physical Exam    Airway  Mallampati: II  TM Distance: > 6 cm  Neck ROM: normal range of motion   Mouth opening: Normal     Cardiovascular  Regular rate and rhythm,  S1 and S2 normal,  no murmur, click, rub, or gallop             Dental  No notable dental hx       Pulmonary  Breath sounds clear to auscultation               Abdominal  GI exam deferred       Other Findings            Anesthetic Plan    ASA: 2  Anesthesia type: MAC            Anesthetic plan and risks discussed with: Patient

## 2020-11-03 NOTE — H&P
[address]  [facility], [address] [place] [address]      History and Physical       NAME:  Levon Pinzon   :   1950   MRN:   [id number]             History of Present Illness:  Patient is a 79 y.o. who is seen for fhx of colon cancer, anemia and GERD.      PMH:  Past Medical History:   Diagnosis Date    Aneurysm ([address]     2 cerebral aneurysm - no surgery or bleed    Arrhythmia     PAC/mitral valve prolapse    Arthritis     osteo    Chronic kidney disease     mild kidney damage - no treatments needed - pt states she is now normal for her age   [facility] Chronic pain     neck/intermittent extremity numbness/lower back    Difficult intubation     limited ability to flex or hyperextend neck d/t fusion (left arm and left leg numbness if neck is flexed to hyperextended    GERD (gastroesophageal reflux disease)     esophageal ulcer/hx gastritis/small sliding hiatal hernia    Hypertension     Ill-defined condition     increased cholesterol    Ill-defined condition     hx anemia/one transfusion in  - nauseated and \"started to pass out\"    Ill-defined condition     gallstones    Ill-defined condition     vertigo    Ill-defined condition     some difficulty swallowing solids    Ill-defined condition     RLQ abdominal pain - per pt may be d/t adhesions    Ill-defined condition     difficulty voiding    Ill-defined condition     persistent nausea/severe at times/episode RUQ pain    Nausea & vomiting     required to stay in hospital a few extra days - no voimiting/severe nausea only    Psychiatric disorder     anxiety/depression       PSH:  Past Surgical History:   Procedure Laterality Date    BREAST SURGERY PROCEDURE UNLISTED      bilateral breast fibroadenomas removed    HX BREAST BIOPSY Right     benign    HX BREAST BIOPSY Left     benign    HX CHOLECYSTECTOMY      HX HYSTERECTOMY      and left ovary removed for adhesions and endometriosis    HX ORTHOPAEDIC      2 neck surgeries - 3 discectomy spinal cord decompression and fusion C3-C6       Allergies: Allergies   Allergen Reactions    Codeine Other (comments)     Increased heart rate, \"felt like I was going to pass out\", nausea.  Contrast Dye [Iodine] Other (comments)     Urticaria.  Demerol [Meperidine] Other (comments)     Increased heart rate, \"felt like I was going to pass out, nausea.  Dilaudid [Hydromorphone] Itching    Epinephrine Nausea Only     \"Felt as if I was going to pass out, and felt as if my heart rate was slowing down\".  Nsaids (Non-Steroidal Anti-Inflammatory Drug) Other (comments)     Have caused kidney problems. Home Medications:  Prior to Admission Medications   Prescriptions Last Dose Informant Patient Reported? Taking? ASPIRIN PO 10/28/2020  Yes Yes   Sig: Take 81 mg by mouth daily. CALCIUM CARBONATE/VITAMIN D3 (CALCIUM 600 WITH VITAMIN D3 PO) 11/1/2020  Yes Yes   Sig: Take 600 mg by mouth two (2) times a day. With 800 units of vitamin d in each pill plus minerals. Iron Fum & PS Cmp-Vit C-Niacin (Integra) 125-40-3 mg cap 10/28/2020  Yes Yes   Sig: Take 1 Cap by mouth daily. cyclobenzaprine (FLEXERIL) 10 mg tablet Not Taking at Unknown time  Yes No   Sig: Take 10 mg by mouth two (2) times daily as needed for Muscle Spasm(s). dexlansoprazole (Dexilant) 60 mg CpDB capsule (delayed release) 11/2/2020 at Unknown time  Yes Yes   Sig: Take 60 mg by mouth daily. escitalopram oxalate (LEXAPRO) 10 mg tablet 11/2/2020  Yes Yes   Sig: Take 10 mg by mouth daily. estradiol (YUVAFEM) 10 mcg tab vaginal tablet   Yes Yes   Sig: Insert 10 mcg into vagina every Monday and Thursday. linaclotide (LINZESS) 290 mcg cap capsule 11/1/2020  Yes Yes   Sig: Take 290 mcg by mouth every other day.    oxyCODONE-acetaminophen (PERCOCET) 5-325 mg per tablet Not Taking at Unknown time  Yes No   Sig: Take 1 Tab by mouth two (2) times daily as needed for Pain.   pravastatin (PRAVACHOL) 40 mg tablet 11/1/2020  Yes Yes   Sig: Take 40 mg by mouth daily. valsartan (DIOVAN) 80 mg tablet 11/1/2020  Yes Yes   Sig: Take 80 mg by mouth daily. Facility-Administered Medications: None       Hospital Medications:  No current facility-administered medications for this encounter. Social History:  Social History     Tobacco Use    Smoking status: Never Smoker    Smokeless tobacco: Never Used   Substance Use Topics    Alcohol use: Yes     Alcohol/week: 4.0 standard drinks     Types: 4 Glasses of wine per week     Comment: rare now per pt on 9/23/2020       Family History:  Family History   Problem Relation Age of Onset    Heart Disease Mother     Other Mother         arthritis/cerebral aneurysm/PVD    Hypertension Mother     High Cholesterol Mother     Colon Cancer Father     Heart Disease Father     Other Father         arthritis    Hypertension Father     High Cholesterol Father     Other Sister         cerebral aneurysm    Other Maternal Grandmother         cerebral aneurysm    Other Maternal Aunt         cerebral aneurysm    Colon Polyps Sister          The patient was counseled at length about the risks of lorena Covid-19 in the rd-operative and post-operative states including the recovery window of their procedure. The patient was made aware that lorena Covid-19 after a surgical procedure may worsen their prognosis for recovering from the virus and lend to a higher morbidity and or mortality risk. The patient was given the options of postponing their procedure. All of the risks, benefits, and alternatives were discussed. The patient does  wish to proceed with the procedure.     Review of Systems:      Constitutional: negative fever, negative chills, negative weight loss  Eyes:   negative visual changes  ENT:   negative sore throat, tongue or lip swelling  Respiratory:  negative cough, negative dyspnea  Cards:  negative for chest pain, palpitations, lower extremity edema  GI: See HPI  :  negative for frequency, dysuria  Integument:  negative for rash and pruritus  Heme:  negative for easy bruising and gum/nose bleeding  Musculoskel: negative for myalgias,  back pain and muscle weakness  Neuro: negative for headaches, dizziness, vertigo  Psych:  negative for feelings of anxiety, depression       Objective:     Patient Vitals for the past 8 hrs:   Height Weight   11/03/20 0740 5' 5\" (1.651 m) 54 kg (119 lb)     No intake/output data recorded. No intake/output data recorded. EXAM:     NEURO-a&o   HEENT-wnl   LUNGS-clear    COR-regular rate and rhythym     ABD-soft , no tenderness, no rebound, good bs     EXT-no edema     Data Review     No results for input(s): WBC, HGB, HCT, PLT, HGBEXT, HCTEXT, PLTEXT in the last 72 hours. No results for input(s): NA, K, CL, CO2, BUN, CREA, GLU, PHOS, CA in the last 72 hours. No results for input(s): AP, TBIL, TP, ALB, GLOB, GGT, AML, LPSE in the last 72 hours. No lab exists for component: SGOT, GPT, AMYP, HLPSE  No results for input(s): INR, PTP, APTT, INREXT in the last 72 hours. Assessment:     · FHX OF COLON CANCER  · GERD  · ANEMIA   There is no problem list on file for this patient.           Plan:   ·   · Endoscopic procedure with sedation     Signed By: Hayes Campa MD     11/3/2020  8:02 AM

## 2020-11-03 NOTE — PROCEDURES
[address]  174 [facility], [address] [place] [address]      Colonoscopy Operative Report    Nickie Richardson  [id number]  1950      Procedure Type:   Colonoscopy with polypectomy (hot biopsy)     Indications:    Family history of coloretal cancer (screening only)   Date of last colonoscopy: 5 years ago, Polyps  No    Pre-operative Diagnosis: see indication above    Post-operative Diagnosis:  See findings below    :  Rosi Mcfarlane MD    Surgical Assistant: Endoscopy RN-1: Dilip Palacio RN  Endoscopy RN-2: Xavier Crowley RN    Implants:  None    Referring Provider: Stevo Garcia MD      Sedation:  MAC anesthesia Propofol      Procedure Details:  After informed consent was obtained with all risks and benefits of procedure explained and preoperative exam completed, the patient was taken to the endoscopy suite and placed in the left lateral decubitus position. Upon sequential sedation as per above, a digital rectal exam was performed demonstrating internal hemorrhoids. The Olympus videocolonoscope  was inserted in the rectum and carefully advanced to the cecum, which was identified by the ileocecal valve and appendiceal orifice. The cecum was identified by the ileocecal valve and appendiceal orifice. The quality of preparation was good. The colonoscope was slowly withdrawn with careful evaluation between folds. Retroflexion in the rectum was completed . Findings:   Rectum: normal  Sigmoid: 7 mm sessile polyp removed by hot biopsy  Descending Colon: normal  Transverse Colon: normal  Ascending Colon: 9 mm sessile polyp removed by hot biopsy  Cecum: normal    Specimen Removed:  as above    Complications: None. EBL:  None. Impression:    see findings    Recommendations: --Await pathology.       Recommendation for next colonscopy in 3 versus 5 years  F/u as needed  F/u with her hematologist, Dr Bogdan Lucia as scheduled  Signed By: Isabella Madrigal MD Kat     11/3/2020  8:41 AM

## 2020-11-03 NOTE — PROCEDURES
[address] [place] [address], [address] [place] [address] (EGD) Procedure Note    Helen Bryant  1950  [id number]      Procedure: Endoscopic Gastroduodenoscopy with biopsy    Indication:  GERD, Iron deficiency anemia     Pre-operative Diagnosis: see indication above    Post-operative Diagnosis: see findings below    : Jack Marcelo MD    Surgical Assistant: Endoscopy RN-1: Yohan Feliciano RN  Endoscopy RN-2: Ced Carver RN    Implants:  None    Referring Provider:  Sophie Carlisle MD      Anesthesia/Sedation:  MAC anesthesia Propofol        Procedure Details     After infomed consent was obtained for the procedure, with all risks and benefits of procedure explained the patient was taken to the endoscopy suite and placed in the left lateral decubitus position. Following sequential administration of sedation as per above, the endoscope was inserted into the mouth and advanced under direct vision to third portion of the duodenum. A careful inspection was made as the gastroscope was withdrawn, including a retroflexed view of the proximal stomach; findings and interventions are described below. Findings:   Esophagus:hiatal hernia 3 cm in size   Stomach: normal   Duodenum: normal, random biopsies taken      Therapies:  none    Specimens: as above         EBL: None      Complications:   None; patient tolerated the procedure well. Impression:    -See post-procedure diagnoses.     Recommendations:  -Continue acid suppression with dexilant  -colonoscopy today    Signed By: Jack Marcelo MD     11/3/2020  8:38 AM

## 2020-11-03 NOTE — ANESTHESIA POSTPROCEDURE EVALUATION
Procedure(s):  COLONOSCOPY/EGD  ESOPHAGOGASTRODUODENOSCOPY (EGD)    :-  ESOPHAGOGASTRODUODENAL (EGD) BIOPSY  ENDOSCOPIC POLYPECTOMY. MAC    Anesthesia Post Evaluation      Multimodal analgesia: multimodal analgesia not used between 6 hours prior to anesthesia start to PACU discharge  Patient location during evaluation: PACU  Patient participation: complete - patient participated  Level of consciousness: awake  Pain score: 0  Pain management: adequate  Airway patency: patent  Anesthetic complications: no  Cardiovascular status: acceptable  Respiratory status: acceptable  Hydration status: acceptable  Comments: I have evaluated the patient and meets criteria for discharge from PACU. Suzy Barrera MD        INITIAL Post-op Vital signs: No vitals data found for the desired time range.

## 2020-11-03 NOTE — ROUTINE PROCESS
Sloane Hernandez 1950 
[id number] Situation: 
Verbal report received from: Jay Pink RN Procedure: Procedure(s): 
COLONOSCOPY/EGD 
ESOPHAGOGASTRODUODENOSCOPY (EGD)    :- 
ESOPHAGOGASTRODUODENAL (EGD) BIOPSY ENDOSCOPIC POLYPECTOMY Background: 
 
Preoperative diagnosis: HISTORY OF POLYPS, FAMILY HISTORY OF COLON CANCER, REFLUX AND NAUSEA Postoperative diagnosis: Small Hiatal Hernia Colon Polyps :  Dr. Muiñz Reagan Assistant(s): Endoscopy RN-1: Deon Alvarez RN Endoscopy RN-2: Sherry Negron RN Specimens:  
ID Type Source Tests Collected by Time Destination 1 : Duodenum Bx Preservative   Tiffany Marks MD 11/3/2020 0929 Pathology 2 : Ascending Colon Polyp Preservative   Tiffany Marks MD 11/3/2020 0830 Pathology 3 : Sigmoid Colon Polyp Preservative   Tiffany Marks MD 11/3/2020 4069 Pathology H. Pylori  no Assessment: 
Intra-procedure medications Anesthesia gave intra-procedure sedation and medications, see anesthesia flow sheet yes Intravenous fluids: NS@ [facility] Vital signs stable Abdominal assessment: round and soft Recommendation: 
Discharge patient per MD order. Family or Friend Permission to share finding with family or friend yes

## 2020-11-03 NOTE — DISCHARGE INSTRUCTIONS
[address], [address] [place] [address]    EGD and COLON DISCHARGE INSTRUCTIONS    Thanh Aguirre  [id number]  1950    Discomfort:  Redness at IV site- apply warm compress to area; if redness or soreness persist- contact your physician  There may be a slight amount of blood passed from the rectum  Gaseous discomfort- walking, belching will help relieve any discomfort  You may not operate a vehicle for 12 hours  You may not engage in an occupation involving machinery or appliances for rest of today  You may not drink alcoholic beverages for at least 12 hours  Avoid making any critical decisions for at least 24 hour  DIET:  You may resume your regular diet - however -  remember your colon is empty and a heavy meal will produce gas. Avoid these foods:  vegetables, fried / greasy foods, carbonated drinks     ACTIVITY:  You may  resume your normal daily activities it is recommended that you spend the remainder of the day resting -  avoid any strenuous activity. CALL M.D.   ANY SIGN OF:   Increasing pain, nausea, vomiting  Abdominal distension (swelling)  New increased bleeding (oral or rectal)  Fever (chills)  Pain in chest area  Bloody discharge from nose or mouth  Shortness of breath      Follow-up Instructions:   Call Dr. Doreen Philip for any questions or problems at [phone] and follow up as needed          ENDOSCOPY FINDINGS:   Your colonoscopy showed 2 small polyps removed, rest of exam was normal   Your endoscopy showed unchanged in size, small hiatal hernia, rest of exam was normal.  Telephone # [phone]      Signed By: Doreen Philip MD     11/3/2020  8:44 AM       DISCHARGE SUMMARY from Nurse    The following personal items collected during your admission are returned to you:   Dental Appliance: Dental Appliances: None  Vision: Visual Aid: None  Hearing Aid:    Jewelry:    Clothing:    Other Valuables:    Valuables sent to safe:        Learning About Coronavirus (COVID-19)  Coronavirus (COVID-19): Overview  What is coronavirus (QTBTF-11)? The coronavirus disease (COVID-19) is caused by a virus. It is an illness that was first found in [place], [place], in December 2019. It has since spread worldwide. The virus can cause fever, cough, and trouble breathing. In severe cases, it can cause pneumonia and make it hard to breathe without help. It can cause death. Coronaviruses are a large group of viruses. They cause the common cold. They also cause more serious illnesses like Middle East respiratory syndrome (MERS) and severe acute respiratory syndrome (SARS). COVID-19 is caused by a novel coronavirus. That means it's a new type that has not been seen in people before. This virus spreads person-to-person through droplets from coughing and sneezing. It can also spread when you are close to someone who is infected. And it can spread when you touch something that has the virus on it, such as a doorknob or a tabletop. What can you do to protect yourself from coronavirus (COVID-19)? The best way to protect yourself from getting sick is to:  · Avoid areas where there is an outbreak. · Avoid contact with people who may be infected. · Wash your hands often with soap or alcohol-based hand sanitizers. · Avoid crowds and try to stay at least 6 feet away from other people. · Wash your hands often, especially after you cough or sneeze. Use soap and water, and scrub for at least 20 seconds. If soap and water aren't available, use an alcohol-based hand . · Avoid touching your mouth, nose, and eyes. What can you do to avoid spreading the virus to others? To help avoid spreading the virus to others:  · Cover your mouth with a tissue when you cough or sneeze. Then throw the tissue in the trash. · Use a disinfectant to clean things that you touch often. · Stay home if you are sick or have been exposed to the virus. Don't go to school, work, or public areas.  And don't use public transportation. · If you are sick:  ? Leave your home only if you need to get medical care. But call the doctor's office first so they know you're coming. And wear a face mask, if you have one.  ? If you have a face mask, wear it whenever you're around other people. It can help stop the spread of the virus when you cough or sneeze. ? Clean and disinfect your home every day. Use household  and disinfectant wipes or sprays. Take special care to clean things that you grab with your hands. These include doorknobs, remote controls, phones, and handles on your refrigerator and microwave. And don't forget countertops, tabletops, bathrooms, and computer keyboards. When to call for help  Call 911 anytime you think you may need emergency care. For example, call if:  · You have severe trouble breathing. (You can't talk at all.)  · You have constant chest pain or pressure. · You are severely dizzy or lightheaded. · You are confused or can't think clearly. · Your face and lips have a blue color. · You pass out (lose consciousness) or are very hard to wake up. Call your doctor now if you develop symptoms such as:  · Shortness of breath. · Fever. · Cough. If you need to get care, call ahead to the doctor's office for instructions before you go. Make sure you wear a face mask, if you have one, to prevent exposing other people to the virus. Where can you get the latest information? The following health organizations are tracking and studying this virus. Their websites contain the most up-to-date information. [facility] also learn what to do if you think you may have been exposed to the virus. · U.S. Centers for Disease Control and Prevention (CDC): The CDC provides updated news about the disease and travel advice. The website also tells you how to prevent the spread of infection. www.cdc.gov  · World Health Organization [facility]): WHO offers information about the virus outbreaks. WHO also has travel advice. www.who.int  Current as of: April 1, 2020               Content Version: 12.4  © 9648-8530 Healthwise, Incorporated. Care instructions adapted under license by your healthcare professional. If you have questions about a medical condition or this instruction, always ask your healthcare professional. [address] any warranty or liability for your use of this information. Patient Education        Colon Polyps: Care Instructions  Your Care Instructions     Colon polyps are growths in the colon or the rectum. The cause of most colon polyps is not known, and most people who get them do not have any problems. But a certain kind can turn into cancer. For this reason, regular testing for colon polyps is important for people as they get older. It is also important for anyone who has an increased risk for colon cancer. Polyps are usually found through routine colon cancer screening tests. Although most colon polyps are not cancerous, they are usually removed and then tested for cancer. Screening for colon cancer saves lives because the cancer can usually be cured if it is caught early. If you have a polyp that is the type that can turn into cancer, you may need more tests to examine your entire colon. The doctor will remove any other polyps that he or she finds, and you will be tested more often. Follow-up care is a key part of your treatment and safety. Be sure to make and go to all appointments, and call your doctor if you are having problems. It's also a good idea to know your test results and keep a list of the medicines you take. How can you care for yourself at home? Regular exams to look for colon polyps are the best way to prevent polyps from turning into colon cancer. These can include stool tests, sigmoidoscopy, colonoscopy, and CT colonography. Talk with your doctor about a testing schedule that is right for you.   To prevent polyps  There is no home treatment that can prevent colon polyps. But these steps may help lower your risk for cancer. · Stay active. Being active can help you get to and stay at a healthy weight. Try to exercise on most days of the week. Walking is a good choice. · Eat well. Choose a variety of vegetables, fruits, legumes (such as peas and beans), fish, poultry, and whole grains. · Do not smoke. If you need help quitting, talk to your doctor about stop-smoking programs and medicines. These can increase your chances of quitting for good. · If you drink alcohol, limit how much you drink. Limit alcohol to 2 drinks a day for men and 1 drink a day for women. When should you call for help? Call your doctor now or seek immediate medical care if:    · You have severe belly pain.     · Your stools are maroon or very bloody. Watch closely for changes in your health, and be sure to contact your doctor if:    · You have a fever.     · You have nausea or vomiting.     · You have a change in bowel habits (new constipation or diarrhea).     · Your symptoms get worse or are not improving as expected. Where can you learn more? Go to http://www.garcia.com/  Enter C571 in the search box to learn more about \"Colon Polyps: Care Instructions. \"  Current as of: April 29, 2020               Content Version: 12.6  © 1199-7136 [employer]. Care instructions adapted under license by [employer] (which disclaims liability or warranty for this information). If you have questions about a medical condition or this instruction, always ask your healthcare professional. [place] [address] any warranty or liability for your use of this information.

## 2020-11-03 NOTE — PROGRESS NOTES

## 2021-11-16 ENCOUNTER — TRANSCRIBE ORDER (OUTPATIENT)
Dept: SCHEDULING | Age: 71
End: 2021-11-16

## 2021-11-16 DIAGNOSIS — I67.1 CEREBRAL ANEURYSM, NONRUPTURED: Primary | ICD-10-CM

## 2022-01-07 ENCOUNTER — HOSPITAL ENCOUNTER (OUTPATIENT)
Dept: MRI IMAGING | Age: 72
Discharge: HOME | End: 2022-01-07
Attending: NURSE PRACTITIONER
Payer: MEDICARE

## 2022-01-07 DIAGNOSIS — I67.1 CEREBRAL ANEURYSM, NONRUPTURED: ICD-10-CM

## 2022-01-07 PROCEDURE — 70544 MR ANGIOGRAPHY HEAD W/O DYE: CPT

## 2023-05-13 RX ORDER — DEXLANSOPRAZOLE 60 MG/1
60 CAPSULE, DELAYED RELEASE ORAL DAILY
COMMUNITY

## 2023-05-13 RX ORDER — ESCITALOPRAM OXALATE 10 MG/1
10 TABLET ORAL DAILY
COMMUNITY

## 2023-05-13 RX ORDER — OXYCODONE HYDROCHLORIDE AND ACETAMINOPHEN 5; 325 MG/1; MG/1
1 TABLET ORAL 2 TIMES DAILY PRN
COMMUNITY

## 2023-05-13 RX ORDER — PRAVASTATIN SODIUM 40 MG
40 TABLET ORAL DAILY
COMMUNITY

## 2023-05-13 RX ORDER — VALSARTAN 80 MG/1
80 TABLET ORAL DAILY
COMMUNITY

## 2023-05-13 RX ORDER — ESTRADIOL 10 UG/1
INSERT VAGINAL
COMMUNITY

## 2023-05-13 RX ORDER — CYCLOBENZAPRINE HCL 10 MG
TABLET ORAL 2 TIMES DAILY PRN
COMMUNITY

## 2023-09-13 ENCOUNTER — HOSPITAL ENCOUNTER (OUTPATIENT)
Facility: HOSPITAL | Age: 73
Setting detail: SPECIMEN
Discharge: HOME | End: 2023-09-16

## 2023-09-13 PROCEDURE — 86480 TB TEST CELL IMMUN MEASURE: CPT

## 2023-09-13 PROCEDURE — 36415 COLL VENOUS BLD VENIPUNCTURE: CPT

## 2023-11-08 ENCOUNTER — ANESTHESIA (OUTPATIENT)
Facility: HOSPITAL | Age: 73
End: 2023-11-08
Payer: MEDICARE

## 2023-11-08 ENCOUNTER — ANESTHESIA EVENT (OUTPATIENT)
Facility: HOSPITAL | Age: 73
End: 2023-11-08
Payer: MEDICARE

## 2023-11-08 ENCOUNTER — HOSPITAL ENCOUNTER (OUTPATIENT)
Facility: HOSPITAL | Age: 73
Setting detail: OUTPATIENT SURGERY
Discharge: HOME | End: 2023-11-08
Attending: INTERNAL MEDICINE | Admitting: INTERNAL MEDICINE
Payer: MEDICARE

## 2023-11-08 VITALS
DIASTOLIC BLOOD PRESSURE: 63 MMHG | TEMPERATURE: 98 F | WEIGHT: 127 LBS | OXYGEN SATURATION: 100 % | HEART RATE: 79 BPM | RESPIRATION RATE: 16 BRPM | HEIGHT: 65 IN | BODY MASS INDEX: 21.16 KG/M2 | SYSTOLIC BLOOD PRESSURE: 113 MMHG

## 2023-11-08 PROCEDURE — 3600007512: Performed by: INTERNAL MEDICINE

## 2023-11-08 PROCEDURE — 2709999900 HC NON-CHARGEABLE SUPPLY: Performed by: INTERNAL MEDICINE

## 2023-11-08 PROCEDURE — 6370000000 HC RX 637 (ALT 250 FOR IP): Performed by: INTERNAL MEDICINE

## 2023-11-08 PROCEDURE — 3700000001 HC ADD 15 MINUTES (ANESTHESIA): Performed by: INTERNAL MEDICINE

## 2023-11-08 PROCEDURE — 3600007502: Performed by: INTERNAL MEDICINE

## 2023-11-08 PROCEDURE — 6360000002 HC RX W HCPCS: Performed by: NURSE ANESTHETIST, CERTIFIED REGISTERED

## 2023-11-08 PROCEDURE — 2500000003 HC RX 250 WO HCPCS: Performed by: NURSE ANESTHETIST, CERTIFIED REGISTERED

## 2023-11-08 PROCEDURE — 2580000003 HC RX 258: Performed by: INTERNAL MEDICINE

## 2023-11-08 PROCEDURE — 7100000011 HC PHASE II RECOVERY - ADDTL 15 MIN: Performed by: INTERNAL MEDICINE

## 2023-11-08 PROCEDURE — 3700000000 HC ANESTHESIA ATTENDED CARE: Performed by: INTERNAL MEDICINE

## 2023-11-08 PROCEDURE — 7100000010 HC PHASE II RECOVERY - FIRST 15 MIN: Performed by: INTERNAL MEDICINE

## 2023-11-08 PROCEDURE — 88305 TISSUE EXAM BY PATHOLOGIST: CPT

## 2023-11-08 RX ORDER — SODIUM CHLORIDE 9 MG/ML
INJECTION, SOLUTION INTRAVENOUS CONTINUOUS
Status: DISCONTINUED | OUTPATIENT
Start: 2023-11-08 | End: 2023-11-08 | Stop reason: HOSPADM

## 2023-11-08 RX ORDER — SIMETHICONE 20 MG/.3ML
EMULSION ORAL PRN
Status: DISCONTINUED | OUTPATIENT
Start: 2023-11-08 | End: 2023-11-08 | Stop reason: ALTCHOICE

## 2023-11-08 RX ORDER — LORAZEPAM 0.5 MG/1
TABLET ORAL
COMMUNITY
Start: 2023-10-26

## 2023-11-08 RX ORDER — LIDOCAINE HYDROCHLORIDE 20 MG/ML
INJECTION, SOLUTION EPIDURAL; INFILTRATION; INTRACAUDAL; PERINEURAL PRN
Status: DISCONTINUED | OUTPATIENT
Start: 2023-11-08 | End: 2023-11-08 | Stop reason: SDUPTHER

## 2023-11-08 RX ORDER — SODIUM CHLORIDE 0.9 % (FLUSH) 0.9 %
5-40 SYRINGE (ML) INJECTION PRN
Status: DISCONTINUED | OUTPATIENT
Start: 2023-11-08 | End: 2023-11-08 | Stop reason: HOSPADM

## 2023-11-08 RX ORDER — EZETIMIBE 10 MG/1
10 TABLET ORAL DAILY
COMMUNITY
Start: 2023-10-23

## 2023-11-08 RX ORDER — SODIUM CHLORIDE 9 MG/ML
25 INJECTION, SOLUTION INTRAVENOUS PRN
Status: DISCONTINUED | OUTPATIENT
Start: 2023-11-08 | End: 2023-11-08 | Stop reason: HOSPADM

## 2023-11-08 RX ORDER — SODIUM CHLORIDE 0.9 % (FLUSH) 0.9 %
5-40 SYRINGE (ML) INJECTION EVERY 12 HOURS SCHEDULED
Status: DISCONTINUED | OUTPATIENT
Start: 2023-11-08 | End: 2023-11-08 | Stop reason: HOSPADM

## 2023-11-08 RX ADMIN — PROPOFOL 40 MG: 10 INJECTION, EMULSION INTRAVENOUS at 14:13

## 2023-11-08 RX ADMIN — PROPOFOL 80 MG: 10 INJECTION, EMULSION INTRAVENOUS at 13:54

## 2023-11-08 RX ADMIN — PROPOFOL 40 MG: 10 INJECTION, EMULSION INTRAVENOUS at 13:57

## 2023-11-08 RX ADMIN — LIDOCAINE HYDROCHLORIDE 40 MG: 20 INJECTION, SOLUTION EPIDURAL; INFILTRATION; INTRACAUDAL; PERINEURAL at 13:54

## 2023-11-08 RX ADMIN — PROPOFOL 40 MG: 10 INJECTION, EMULSION INTRAVENOUS at 14:01

## 2023-11-08 RX ADMIN — SODIUM CHLORIDE: 9 INJECTION, SOLUTION INTRAVENOUS at 13:49

## 2023-11-08 RX ADMIN — PROPOFOL 40 MG: 10 INJECTION, EMULSION INTRAVENOUS at 14:07

## 2023-11-08 RX ADMIN — PROPOFOL 40 MG: 10 INJECTION, EMULSION INTRAVENOUS at 14:04

## 2023-11-08 RX ADMIN — PROPOFOL 40 MG: 10 INJECTION, EMULSION INTRAVENOUS at 14:10

## 2023-11-08 ASSESSMENT — PAIN SCALES - GENERAL
PAINLEVEL_OUTOF10: 0

## 2023-11-08 NOTE — OP NOTE
[address], [facility] [address] [facility]      Colonoscopy Operative Report    Nitish Morris  [id number]  1950      Procedure Type:   Colonoscopy with polypectomy (hot biopsy)     Indications:    Personal history of colon polyps (screening only)       Pre-operative Diagnosis: see indication above    Post-operative Diagnosis:  See findings below    :  Jim Ramesh MD    Surgical Assistant: Circulator: Rogerio Velazquez RN  Endoscopy Technician: Yumi Simmons    Implants:  None    Referring Provider: Ramiro Mejía MD      Sedation:  MAC anesthesia Propofol      Procedure Details:  After informed consent was obtained with all risks and benefits of procedure explained and preoperative exam completed, the patient was taken to the endoscopy suite and placed in the left lateral decubitus position. Upon sequential sedation as per above, a digital rectal exam was performed demonstrating internal hemorrhoids. The Olympus videocolonoscope  was inserted in the rectum and carefully advanced to the cecum, which was identified by the ileocecal valve and appendiceal orifice. The cecum was identified by the ileocecal valve and appendiceal orifice. The quality of preparation was good. The colonoscope was slowly withdrawn with careful evaluation between folds. Retroflexion in the rectum was completed . Findings:   Rectum: normal  Sigmoid: normal  Descending Colon: normal  Transverse Colon: 1 cm sessile polyp, removed by hot biopsies  Ascending Colon: 1 cm sessile polyp, removed by hot biopsies    Cecum: normal    Specimen Removed:   as above     Complications: None. EBL:  None. Impression:     see findings     Recommendations: --Await pathology.       Recommendation for next colonscopy in 3 years  Follow up in 6 months     Signed By: Jim Ramesh MD     11/8/2023  2:21 PM

## 2023-11-08 NOTE — DISCHARGE INSTRUCTIONS
[address] [facility]  [id number]  1950    Discomfort:  Redness at IV site- apply warm compress to area; if redness or soreness persist- contact your physician  There may be a slight amount of blood passed from the rectum  Gaseous discomfort- walking, belching will help relieve any discomfort  You may not operate a vehicle for 12 hours  You may not engage in an occupation involving machinery or appliances for rest of today  You may not drink alcoholic beverages for at least 12 hours  Avoid making any critical decisions for at least 24 hour  DIET:  You may resume your regular diet - however -  remember your colon is empty and a heavy meal will produce gas. Avoid these foods:  vegetables, fried / greasy foods, carbonated drinks     ACTIVITY:  You may  resume your normal daily activities it is recommended that you spend the remainder of the day resting -  avoid any strenuous activity. CALL M.D.   ANY SIGN OF:   Increasing pain, nausea, vomiting  Abdominal distension (swelling)  New increased bleeding (oral or rectal)  Fever (chills)  Pain in chest area  Bloody discharge from nose or mouth  Shortness of breath      Follow-up Instructions:   Call Dr. Liliya Myers for any questions or problems at [phone], and follow up in 6 months           ENDOSCOPY FINDINGS:   Your colonoscopy showed 2 small polyps, all removed, rest of exam was normal .  Telephone # [phone]      Signed By: Liliya Myers MD     11/8/2023  2:23 PM

## 2023-11-08 NOTE — PROGRESS NOTES

## 2023-11-08 NOTE — H&P
[facility]  [address], [address] [facility]      History and Physical       NAME:  Higinio Sparks   :   1950   MRN:   [id number]             History of Present Illness:  Patient is a 68 y.o. who is seen for h/o colon polyps .      PMH:  Past Medical History:   Diagnosis Date    Aneurysm ([address])     2 cerebral aneurysm - no surgery or bleed    Arrhythmia     PAC/mitral valve prolapse    Arthritis     osteo    Chronic kidney disease     mild kidney damage - no treatments needed - pt states she is now normal for her age    Chronic pain     neck/intermittent extremity numbness/lower back    Difficult intubation     limited ability to flex or hyperextend neck d/t fusion (left arm and left leg numbness if neck is flexed to hyperextended    GERD (gastroesophageal reflux disease)     esophageal ulcer/hx gastritis/small sliding hiatal hernia    Hypertension     Ill-defined condition     hx anemia/one transfusion in  - nauseated and \"started to pass out\"    Ill-defined condition     gallstones    Ill-defined condition     vertigo    Ill-defined condition     some difficulty swallowing solids    Ill-defined condition     difficulty voiding    Ill-defined condition     persistent nausea/severe at times/episode RUQ pain    Ill-defined condition     increased cholesterol    Ill-defined condition     RLQ abdominal pain - per pt may be d/t adhesions    Nausea & vomiting     required to stay in hospital a few extra days - no voimiting/severe nausea only    Psychiatric disorder     anxiety/depression       PSH:  Past Surgical History:   Procedure Laterality Date    BREAST BIOPSY Left     benign    BREAST BIOPSY Right     benign    BREAST SURGERY      bilateral breast fibroadenomas removed    CHOLECYSTECTOMY      COLONOSCOPY N/A 11/3/2020    COLONOSCOPY/EGD performed by Dallas Hernandes MD at [address] ( [facility] [address])      and left ovary removed for adhesions and

## (undated) DEVICE — SYRINGE MED 20ML STD CLR PLAS LUERLOCK TIP N CTRL DISP

## (undated) DEVICE — FORCEPS BX L240CM JAW DIA2.8MM L CAP W/ NDL MIC MESH TOOTH

## (undated) DEVICE — 1200 GUARD II KIT W/5MM TUBE W/O VAC TUBE: Brand: GUARDIAN

## (undated) DEVICE — KIT IV STRT W CHLORAPREP PD 1ML

## (undated) DEVICE — BW-412T DISP COMBO CLEANING BRUSH: Brand: SINGLE USE COMBINATION CLEANING BRUSH

## (undated) DEVICE — KENDALL RADIOLUCENT FOAM MONITORING ELECTRODE -RECTANGULAR SHAPE: Brand: KENDALL

## (undated) DEVICE — REM POLYHESIVE ADULT PATIENT RETURN ELECTRODE: Brand: VALLEYLAB

## (undated) DEVICE — FORCEPS BX L240CM JAW DIA2.2MM RAD JAW 4 HOT DISP

## (undated) DEVICE — CANN NASAL O2 CAPNOGRAPHY AD -- FILTERLINE

## (undated) DEVICE — ELECTRODE PT RET AD L9FT HI MOIST COND ADH HYDRGEL CORDED

## (undated) DEVICE — SET EXTN TBNG L BOR 4 W STPCOCK ST 32IN PRIMING VOL 6ML

## (undated) DEVICE — SET ADMIN 16ML TBNG L100IN 2 Y INJ SITE IV PIGGY BK DISP

## (undated) DEVICE — TUBING HYDR IRR --

## (undated) DEVICE — CATH IV AUTOGRD BC BLU 22GA 25 -- INSYTE

## (undated) DEVICE — SOLIDIFIER FLUID 3000 CC ABSORB

## (undated) DEVICE — FCPS BX HOT RJ4 2.2MMX240CM -- RADIAL JAW 4 BX/40

## (undated) DEVICE — CONTAINER SPEC 20 ML LID NEUT BUFF FORMALIN 10 % POLYPR STS

## (undated) DEVICE — BAG SPEC BIOHZD LF 2MIL 6X10IN -- CONVERT TO ITEM 357326

## (undated) DEVICE — ENDO CARRY-ON PROCEDURE KIT INCLUDES ENZYMATIC SPONGE, GAUZE, BIOHAZARD LABEL, TRAY, LUBRICANT, DIRTY SCOPE LABEL, WATER LABEL, TRAY, DRAWSTRING PAD, AND DEFENDO 4-PIECE KIT.: Brand: ENDO CARRY-ON PROCEDURE KIT

## (undated) DEVICE — BAG BELONG PT PERS CLEAR HANDL

## (undated) DEVICE — NEEDLE HYPO 18GA L1.5IN PNK S STL HUB POLYPR SHLD REG BVL